# Patient Record
Sex: MALE | Race: WHITE | URBAN - METROPOLITAN AREA
[De-identification: names, ages, dates, MRNs, and addresses within clinical notes are randomized per-mention and may not be internally consistent; named-entity substitution may affect disease eponyms.]

---

## 2017-05-23 PROBLEM — H26.9 RIGHT CATARACT: Status: ACTIVE | Noted: 2017-05-23

## 2019-05-14 ENCOUNTER — HOSPITAL ENCOUNTER (OUTPATIENT)
Age: 79
Discharge: HOME | End: 2019-05-16

## 2019-05-14 PROCEDURE — 88305 TISSUE EXAM BY PATHOLOGIST: CPT

## 2022-07-07 ENCOUNTER — HOSPITAL ENCOUNTER (OUTPATIENT)
Age: 82
Discharge: HOME | End: 2022-07-09

## 2022-07-07 PROCEDURE — 88305 TISSUE EXAM BY PATHOLOGIST: CPT

## 2023-01-25 ENCOUNTER — HOSPITAL ENCOUNTER (OUTPATIENT)
Dept: GENERAL RADIOLOGY | Age: 83
Discharge: HOME | End: 2023-01-27
Payer: MEDICARE

## 2023-01-25 ENCOUNTER — HOSPITAL ENCOUNTER (OUTPATIENT)
Age: 83
Discharge: HOME | End: 2023-01-27
Payer: MEDICARE

## 2023-01-25 DIAGNOSIS — M54.50 LOW BACK PAIN, UNSPECIFIED BACK PAIN LATERALITY, UNSPECIFIED CHRONICITY, UNSPECIFIED WHETHER SCIATICA PRESENT: ICD-10-CM

## 2023-01-25 PROCEDURE — 72110 X-RAY EXAM L-2 SPINE 4/>VWS: CPT

## 2023-07-22 ENCOUNTER — HOSPITAL ENCOUNTER (OUTPATIENT)
Age: 83
Discharge: HOME | End: 2023-07-22
Payer: MEDICARE

## 2023-07-22 PROCEDURE — 86617 LYME DISEASE ANTIBODY: CPT

## 2023-07-22 PROCEDURE — 36415 COLL VENOUS BLD VENIPUNCTURE: CPT

## 2023-07-25 LAB
B BURGDOR IGG SER QL IB: NEGATIVE
B BURGDOR IGM SER QL IB: NEGATIVE

## 2024-01-29 ENCOUNTER — HOSPITAL ENCOUNTER (OUTPATIENT)
Age: 84
Discharge: HOME | End: 2024-01-31

## 2024-01-29 PROCEDURE — 88300 SURGICAL PATH GROSS: CPT

## 2024-01-29 PROCEDURE — 82365 CALCULUS SPECTROSCOPY: CPT

## 2024-02-01 LAB — SURGICAL PATHOLOGY REPORT: NORMAL

## 2024-02-04 LAB
STONE COMPOSITION: NORMAL
STONE DESCRIPTION: NORMAL
STONE MASS: 2611 MG

## 2024-12-26 ENCOUNTER — OFFICE VISIT (OUTPATIENT)
Dept: PODIATRY | Age: 84
End: 2024-12-26
Payer: MEDICARE

## 2024-12-26 VITALS — HEIGHT: 72 IN | BODY MASS INDEX: 29.8 KG/M2 | WEIGHT: 220 LBS

## 2024-12-26 DIAGNOSIS — I73.9 PVD (PERIPHERAL VASCULAR DISEASE) (HCC): ICD-10-CM

## 2024-12-26 DIAGNOSIS — M20.41 HAMMER TOE OF RIGHT FOOT: Primary | ICD-10-CM

## 2024-12-26 DIAGNOSIS — R26.2 DIFFICULTY WALKING: ICD-10-CM

## 2024-12-26 DIAGNOSIS — I87.2 VENOUS INSUFFICIENCY (CHRONIC) (PERIPHERAL): ICD-10-CM

## 2024-12-26 DIAGNOSIS — L97.511 ULCER OF TOE, RIGHT, LIMITED TO BREAKDOWN OF SKIN (HCC): ICD-10-CM

## 2024-12-26 PROCEDURE — 1123F ACP DISCUSS/DSCN MKR DOCD: CPT | Performed by: PODIATRIST

## 2024-12-26 PROCEDURE — 1159F MED LIST DOCD IN RCRD: CPT | Performed by: PODIATRIST

## 2024-12-26 PROCEDURE — 99204 OFFICE O/P NEW MOD 45 MIN: CPT | Performed by: PODIATRIST

## 2024-12-26 NOTE — PROGRESS NOTES
Patient presents for second opinion, reports that he was seen by another podiatrist for concerns of fungal growth but has decreased sensation in both feet. He was previously being treated with two types of medication but only using one of them. PCP Ruslan Gibbs.

## 2024-12-26 NOTE — PROGRESS NOTES
24     Cory Chew    : 1940 Sex: male   Age: 84 y.o.    Patient was referred by: None  Patient's PCP/Provider is:  Ruslan Gibbs DO    Subjective:    Patient seen today for evaluation regarding chronic ulceration right third toe.    Chief Complaint   Patient presents with    Referral - General     Second opinion       HPI: Patient stated they have been treated by multiple physicians recently regarding the hammertoe issues, corns, ulcerations present.  They presented today to discuss secondary opinions on care.  Patient is in no acute distress.  He denies any nausea, vomiting, fever, chills.  Patient is trying to wear appropriate shoe gear as directed.  No other additional abnormalities noted at this time.    ROS:  Const: Positives and pertinent negatives as per HPI.     Musculo: Denies symptoms other than stated above.  Neuro: Denies symptoms other than stated above.  Skin: Denies symptoms other than stated above.    Current Medications:    Current Outpatient Medications:     amLODIPine (NORVASC) 5 MG tablet, Take 1 tablet by mouth nightly, Disp: , Rfl:     Allergies:  Allergies   Allergen Reactions    Sulfa Antibiotics Other (See Comments)     hallucinations       Vitals:    24 1058   Weight: 99.8 kg (220 lb)   Height: 1.829 m (6')        Past Medical History:   Diagnosis Date    Claustrophobia     Hyperlipidemia     takes red yeast rice    Hypertension     Sleep apnea     doesnt use his cpap franci it keeps him awake at night     History reviewed. No pertinent family history.  Past Surgical History:   Procedure Laterality Date    CATARACT REMOVAL WITH IMPLANT Left 2016    CATARACT REMOVAL WITH IMPLANT Right 2017    CHOLECYSTECTOMY      COLONOSCOPY      ENDOSCOPY, COLON, DIAGNOSTIC      HEMORRHOID SURGERY       Social History     Tobacco Use    Smoking status: Former     Types: Cigarettes    Smokeless tobacco: Never   Substance Use Topics    Alcohol use: Yes     Comment:

## 2025-01-02 ENCOUNTER — OFFICE VISIT (OUTPATIENT)
Dept: PODIATRY | Age: 85
End: 2025-01-02
Payer: MEDICARE

## 2025-01-02 VITALS
WEIGHT: 220 LBS | OXYGEN SATURATION: 98 % | HEART RATE: 52 BPM | HEIGHT: 72 IN | TEMPERATURE: 96.9 F | BODY MASS INDEX: 29.8 KG/M2

## 2025-01-02 DIAGNOSIS — M20.41 HAMMER TOE OF RIGHT FOOT: Primary | ICD-10-CM

## 2025-01-02 DIAGNOSIS — I87.2 VENOUS INSUFFICIENCY (CHRONIC) (PERIPHERAL): ICD-10-CM

## 2025-01-02 DIAGNOSIS — I73.9 PVD (PERIPHERAL VASCULAR DISEASE) (HCC): ICD-10-CM

## 2025-01-02 DIAGNOSIS — L97.511 ULCER OF TOE, RIGHT, LIMITED TO BREAKDOWN OF SKIN (HCC): ICD-10-CM

## 2025-01-02 PROCEDURE — 99213 OFFICE O/P EST LOW 20 MIN: CPT | Performed by: PODIATRIST

## 2025-01-02 PROCEDURE — 1159F MED LIST DOCD IN RCRD: CPT | Performed by: PODIATRIST

## 2025-01-02 PROCEDURE — 1123F ACP DISCUSS/DSCN MKR DOCD: CPT | Performed by: PODIATRIST

## 2025-01-02 NOTE — PROGRESS NOTES
25     Cory Chew    : 1940   Sex: male    Age: 84 y.o.    Patient's PCP/Provider is:  Ruslan Gibbs DO    Subjective:  Patient is seen today for follow-up regarding continued care regarding ulceration distal aspect right third toe.  Patient has been applying the topical wound dressings as instructed and utilizing the toe crest pad to properly offload the area.  Patient is pleased with current care provided at this time.  He denies any additional abnormalities.    Chief Complaint   Patient presents with    Wound Check     Right foot/toe       ROS:  Const: Positives and pertinent negatives as per HPI.    Musculo: Denies symptoms other than stated above.  Neuro: Denies symptoms other than stated above.  Skin: Denies symptoms other than stated above.    Current Medications:    Current Outpatient Medications:     amLODIPine (NORVASC) 5 MG tablet, Take 1 tablet by mouth nightly, Disp: , Rfl:     Allergies:  Allergies   Allergen Reactions    Sulfa Antibiotics Other (See Comments)     hallucinations       Vitals:    25 1552   Pulse: 52   Temp: 96.9 °F (36.1 °C)   SpO2: 98%   Weight: 99.8 kg (220 lb)   Height: 1.829 m (6')       Exam:  Neurovascular status unchanged.  Hammertoe issues stable right foot.  Ulcerative area improved distal aspect right third toe measuring approximately 0.3 cm in diameter with minimal depth noted.  No signs of infection noted right third toe.  No maceration of webspaces noted right foot.  Stable gait noted with current offloading insoles and shoe gear being utilized.      Diagnostic Studies:     No results found.      Procedures:    None    Plan Per Assessment  Cory was seen today for wound check.    Diagnoses and all orders for this visit:    Hammer toe of right foot    Ulcer of toe, right, limited to breakdown of skin (HCC)    PVD (peripheral vascular disease) (HCC)    Venous insufficiency (chronic) (peripheral)      Evaluation and management  We did discuss

## 2025-01-09 ENCOUNTER — OFFICE VISIT (OUTPATIENT)
Dept: PODIATRY | Age: 85
End: 2025-01-09
Payer: MEDICARE

## 2025-01-09 VITALS
WEIGHT: 220 LBS | HEART RATE: 83 BPM | TEMPERATURE: 97.3 F | OXYGEN SATURATION: 98 % | HEIGHT: 72 IN | BODY MASS INDEX: 29.8 KG/M2

## 2025-01-09 DIAGNOSIS — M20.41 HAMMER TOE OF RIGHT FOOT: Primary | ICD-10-CM

## 2025-01-09 DIAGNOSIS — R26.2 DIFFICULTY WALKING: ICD-10-CM

## 2025-01-09 DIAGNOSIS — L97.511 ULCER OF TOE, RIGHT, LIMITED TO BREAKDOWN OF SKIN (HCC): ICD-10-CM

## 2025-01-09 PROCEDURE — 1159F MED LIST DOCD IN RCRD: CPT | Performed by: PODIATRIST

## 2025-01-09 PROCEDURE — 1123F ACP DISCUSS/DSCN MKR DOCD: CPT | Performed by: PODIATRIST

## 2025-01-09 PROCEDURE — 99213 OFFICE O/P EST LOW 20 MIN: CPT | Performed by: PODIATRIST

## 2025-01-09 NOTE — PROGRESS NOTES
Patient here today for wound of the left third toe.   Ruslan Gibbs,  last visit 10/8/2024  Electronically signed by Catia Palumbo LPN on 1/9/2025 at 10:50 AM

## 2025-01-09 NOTE — PROGRESS NOTES
25     Cory Chew    : 1940   Sex: male    Age: 84 y.o.    Patient's PCP/Provider is:  Ruslan Gibbs DO    Subjective:  Patient is seen today for follow-up regarding continued care regarding chronic hammertoe issues distal ulceration right third toe.  Overall patient is doing well at this time without any additional abnormalities noted.  Patient is utilizing the topical wound dressings and toe crest pad as instructed.  No other additional abnormalities noted at this time.    Chief Complaint   Patient presents with    Wound Check     Right third toe wound       ROS:  Const: Positives and pertinent negatives as per HPI.    Musculo: Denies symptoms other than stated above.  Neuro: Denies symptoms other than stated above.  Skin: Denies symptoms other than stated above.    Current Medications:    Current Outpatient Medications:     amLODIPine (NORVASC) 5 MG tablet, Take 1 tablet by mouth nightly, Disp: , Rfl:     Allergies:  Allergies   Allergen Reactions    Sulfa Antibiotics Other (See Comments)     hallucinations       Vitals:    25 1048   Pulse: 83   Temp: 97.3 °F (36.3 °C)   TempSrc: Temporal   SpO2: 98%   Weight: 99.8 kg (220 lb)   Height: 1.829 m (6')       Exam:  Neurovascular status unchanged.  Hammertoe deformities noted right foot.  Ulcerative area improved distal aspect right third toe measuring approximately 0.3 cm in diameter with minimal depth noted.  Minimal hyperkeratosis noted in periphery.  No signs of infection noted digital regions right foot.      Diagnostic Studies:     No results found.      Procedures:    None    Plan Per Assessment  Cory was seen today for wound check.    Diagnoses and all orders for this visit:    Hammer toe of right foot    Ulcer of toe, right, limited to breakdown of skin (HCC)    Difficulty walking      Evaluation and management  We did discuss appropriate footcare options and wound care options for the ulcerative area digital regions right

## 2025-01-23 ENCOUNTER — PROCEDURE VISIT (OUTPATIENT)
Dept: PODIATRY | Age: 85
End: 2025-01-23
Payer: MEDICARE

## 2025-01-23 VITALS
WEIGHT: 220 LBS | TEMPERATURE: 97.8 F | OXYGEN SATURATION: 98 % | HEIGHT: 72 IN | BODY MASS INDEX: 29.8 KG/M2 | HEART RATE: 65 BPM

## 2025-01-23 DIAGNOSIS — I73.9 PVD (PERIPHERAL VASCULAR DISEASE) (HCC): ICD-10-CM

## 2025-01-23 DIAGNOSIS — M20.41 HAMMER TOE OF RIGHT FOOT: Primary | ICD-10-CM

## 2025-01-23 DIAGNOSIS — R26.2 DIFFICULTY WALKING: ICD-10-CM

## 2025-01-23 PROCEDURE — 99213 OFFICE O/P EST LOW 20 MIN: CPT | Performed by: PODIATRIST

## 2025-01-23 PROCEDURE — 1159F MED LIST DOCD IN RCRD: CPT | Performed by: PODIATRIST

## 2025-01-23 PROCEDURE — 1123F ACP DISCUSS/DSCN MKR DOCD: CPT | Performed by: PODIATRIST

## 2025-01-23 NOTE — PROGRESS NOTES
Patient here for wound on third toe of right foot. Patient thinks it is feeling better.  Ruslan Gibbs DO   Electronically signed by Catia Palumbo LPN on 1/23/2025 at 10:27 AM

## 2025-01-23 NOTE — PROGRESS NOTES
25     Cory Chew    : 1940   Sex: male    Age: 84 y.o.    Patient's PCP/Provider is:  Ruslan Gibbs DO    Subjective:  Patient is seen today for follow-up regarding continued evaluation regarding wound right third toe.  Overall patient is doing well at this time without any additional abnormalities noted.  Patient is pleased with care provided at this time.  He is utilizing the topical medications as instructed.  No other additional abnormalities noted at this time.    Chief Complaint   Patient presents with    Wound Check     Wound of the right third toe       ROS:  Const: Positives and pertinent negatives as per HPI.    Musculo: Denies symptoms other than stated above.  Neuro: Denies symptoms other than stated above.  Skin: Denies symptoms other than stated above.    Current Medications:    Current Outpatient Medications:     amLODIPine (NORVASC) 5 MG tablet, Take 1 tablet by mouth nightly, Disp: , Rfl:     Allergies:  Allergies   Allergen Reactions    Sulfa Antibiotics Other (See Comments)     hallucinations       Vitals:    25 1026   Pulse: 65   Temp: 97.8 °F (36.6 °C)   TempSrc: Temporal   SpO2: 98%   Weight: 99.8 kg (220 lb)   Height: 1.829 m (6')       Exam:  Neurovascular status unchanged.  Hammertoe issues stable digital regions right foot.  Ulcerative area healed digital regions right foot.  No maceration webspaces noted right foot.  Improved gait noted upon evaluation.      Diagnostic Studies:     No results found.      Procedures:    None    Plan Per Assessment  Cory was seen today for wound check.    Diagnoses and all orders for this visit:    Hammer toe of right foot    PVD (peripheral vascular disease) (HCC)    Difficulty walking      Evaluation and management  We did discuss appropriate skin and footcare options to prevent reoccurrence of symptoms.  Patient was advised continued use of his good supportive shoe gear and toe crest pads as needed to prevent

## 2025-04-23 ENCOUNTER — PROCEDURE VISIT (OUTPATIENT)
Dept: PODIATRY | Age: 85
End: 2025-04-23
Payer: MEDICARE

## 2025-04-23 VITALS — WEIGHT: 220 LBS | HEIGHT: 72 IN | BODY MASS INDEX: 29.8 KG/M2

## 2025-04-23 DIAGNOSIS — I73.9 PVD (PERIPHERAL VASCULAR DISEASE): ICD-10-CM

## 2025-04-23 DIAGNOSIS — I87.2 VENOUS INSUFFICIENCY (CHRONIC) (PERIPHERAL): ICD-10-CM

## 2025-04-23 DIAGNOSIS — B35.1 ONYCHOMYCOSIS: Primary | ICD-10-CM

## 2025-04-23 DIAGNOSIS — R26.2 DIFFICULTY WALKING: ICD-10-CM

## 2025-04-23 DIAGNOSIS — M20.41 HAMMER TOE OF RIGHT FOOT: ICD-10-CM

## 2025-04-23 PROCEDURE — 1159F MED LIST DOCD IN RCRD: CPT | Performed by: PODIATRIST

## 2025-04-23 PROCEDURE — 11721 DEBRIDE NAIL 6 OR MORE: CPT | Performed by: PODIATRIST

## 2025-04-23 PROCEDURE — 1123F ACP DISCUSS/DSCN MKR DOCD: CPT | Performed by: PODIATRIST

## 2025-04-23 PROCEDURE — 99213 OFFICE O/P EST LOW 20 MIN: CPT | Performed by: PODIATRIST

## 2025-04-23 NOTE — PROGRESS NOTES
Patient in today for nail care. Patient does not have any complaints of pain at this time. Patient's PCP is Ruslan Gibbs DO date of last ov 1/15/25          Ivelisse Reynolds LPN

## 2025-04-23 NOTE — PROGRESS NOTES
25     Cory Chew    : 1940  Sex: male  Age: 85 y.o.    Subjective:  The patient is seen today for evaluation regarding foot evaluation, mycotic nail care, and evaluation regarding chronic hammertoe issues and callosities to both feet.  No other additional abnormalities noted.  Patient has been trying to wear the toe crest pad as dispensed last visit with improvement noted.  No other complaints noted.    Chief Complaint   Patient presents with    Nail Problem     Nail care       Current Medications:    Current Outpatient Medications:     amLODIPine (NORVASC) 5 MG tablet, Take 1 tablet by mouth nightly, Disp: , Rfl:     Allergies:  Allergies   Allergen Reactions    Sulfa Antibiotics Other (See Comments)     hallucinations       Past Surgical History:   Procedure Laterality Date    CATARACT REMOVAL WITH IMPLANT Left 2016    CATARACT REMOVAL WITH IMPLANT Right 2017    CHOLECYSTECTOMY      COLONOSCOPY      ENDOSCOPY, COLON, DIAGNOSTIC      HEMORRHOID SURGERY       Past Medical History:   Diagnosis Date    Claustrophobia     Hyperlipidemia     takes red yeast rice    Hypertension     Sleep apnea     doesnt use his cpap franci it keeps him awake at night       Vitals:    25 0935   Weight: 99.8 kg (220 lb)   Height: 1.829 m (6')       Exam:  Pedal pulses diminished to palpation bilateral foot.  At this time the nail/s 1, 2, 3, 4, 5 right foot and nail/s 1, 2, 3, 4, 5 left foot are noted to be thickened, dystrophic and discolored with subungual debris present.  Tenderness noted to palpation.  Diminished hair growth is noted to both lower extremities.  Edema noted with both varicosities and stasis skin changes present bilaterally.  Coolness is noted to the digital regions to palpation.  Capillary fill time delayed digital areas bilateral foot.  No heel fissuring or macerations of the web spaces.  Hammertoe issues stable bilateral foot.  No plantar calluses and/or ulcerative areas are noted.

## 2025-07-30 ENCOUNTER — PROCEDURE VISIT (OUTPATIENT)
Dept: PODIATRY | Age: 85
End: 2025-07-30
Payer: MEDICARE

## 2025-07-30 VITALS
HEART RATE: 53 BPM | DIASTOLIC BLOOD PRESSURE: 72 MMHG | BODY MASS INDEX: 29.84 KG/M2 | SYSTOLIC BLOOD PRESSURE: 137 MMHG | TEMPERATURE: 97.7 F | WEIGHT: 220 LBS

## 2025-07-30 DIAGNOSIS — R26.2 DIFFICULTY WALKING: ICD-10-CM

## 2025-07-30 DIAGNOSIS — I73.9 PVD (PERIPHERAL VASCULAR DISEASE): ICD-10-CM

## 2025-07-30 DIAGNOSIS — I87.2 VENOUS INSUFFICIENCY (CHRONIC) (PERIPHERAL): ICD-10-CM

## 2025-07-30 DIAGNOSIS — L85.3 XEROSIS CUTIS: ICD-10-CM

## 2025-07-30 DIAGNOSIS — M20.41 HAMMER TOE OF RIGHT FOOT: ICD-10-CM

## 2025-07-30 DIAGNOSIS — B35.1 ONYCHOMYCOSIS: Primary | ICD-10-CM

## 2025-07-30 PROCEDURE — 1123F ACP DISCUSS/DSCN MKR DOCD: CPT | Performed by: PODIATRIST

## 2025-07-30 PROCEDURE — 99213 OFFICE O/P EST LOW 20 MIN: CPT | Performed by: PODIATRIST

## 2025-07-30 PROCEDURE — 11721 DEBRIDE NAIL 6 OR MORE: CPT | Performed by: PODIATRIST

## 2025-07-30 PROCEDURE — 1159F MED LIST DOCD IN RCRD: CPT | Performed by: PODIATRIST

## 2025-07-30 RX ORDER — AMMONIUM LACTATE 12 G/100G
CREAM TOPICAL
Qty: 385 G | Refills: 4 | Status: SHIPPED | OUTPATIENT
Start: 2025-07-30

## 2025-07-30 NOTE — PROGRESS NOTES
25     Cory Chew    : 1940  Sex: male  Age: 85 y.o.    Subjective:  The patient is seen today for evaluation regarding foot evaluation, mycotic nail care, and evaluation regarding chronic xerotic skin changes to both lower extremities.  No other complaints noted.    Chief Complaint   Patient presents with    Nail Problem       Current Medications:    Current Outpatient Medications:     ammonium lactate (AMLACTIN) 12 % cream, Apply topically to affected areas daily, Disp: 385 g, Rfl: 4    amLODIPine (NORVASC) 5 MG tablet, Take 1 tablet by mouth nightly, Disp: , Rfl:     Allergies:  Allergies   Allergen Reactions    Sulfa Antibiotics Other (See Comments)     hallucinations       Past Surgical History:   Procedure Laterality Date    CATARACT REMOVAL WITH IMPLANT Left 2016    CATARACT REMOVAL WITH IMPLANT Right 2017    CHOLECYSTECTOMY      COLONOSCOPY      ENDOSCOPY, COLON, DIAGNOSTIC      HEMORRHOID SURGERY       Past Medical History:   Diagnosis Date    Claustrophobia     Hyperlipidemia     takes red yeast rice    Hypertension     Sleep apnea     doesnt use his cpap rfanci it keeps him awake at night       Vitals:    25 0933   BP: 137/72   Pulse: 53   Temp: 97.7 °F (36.5 °C)   TempSrc: Temporal   Weight: 99.8 kg (220 lb)       Exam:  Pedal pulses diminished to palpation bilateral foot.  At this time the nail/s 1, 2, 3, 4, 5 right foot and nail/s 1, 2, 3, 4, 5 left foot are noted to be thickened, dystrophic and discolored with subungual debris present.  Tenderness noted to palpation.  Minimal hair growth is noted to both lower extremities.  Edema noted with both varicosities and stasis skin changes present bilaterally.  Coolness is noted to the digital regions to palpation.  Capillary fill time delayed digital areas bilateral foot.  No heel fissuring or macerations of the web spaces.  Chronic xerotic skin changes noted to both lower extremities.  Patient is having difficulty with